# Patient Record
Sex: MALE | Race: WHITE
[De-identification: names, ages, dates, MRNs, and addresses within clinical notes are randomized per-mention and may not be internally consistent; named-entity substitution may affect disease eponyms.]

---

## 2018-08-21 ENCOUNTER — HOSPITAL ENCOUNTER (EMERGENCY)
Dept: HOSPITAL 76 - ED | Age: 53
Discharge: HOME | End: 2018-08-21
Payer: MEDICAID

## 2018-08-21 VITALS — DIASTOLIC BLOOD PRESSURE: 73 MMHG | SYSTOLIC BLOOD PRESSURE: 125 MMHG

## 2018-08-21 DIAGNOSIS — L03.114: Primary | ICD-10-CM

## 2018-08-21 DIAGNOSIS — Z23: ICD-10-CM

## 2018-08-21 DIAGNOSIS — F17.200: ICD-10-CM

## 2018-08-21 PROCEDURE — 10060 I&D ABSCESS SIMPLE/SINGLE: CPT

## 2018-08-21 PROCEDURE — 99283 EMERGENCY DEPT VISIT LOW MDM: CPT

## 2018-08-21 PROCEDURE — 90471 IMMUNIZATION ADMIN: CPT

## 2018-08-21 PROCEDURE — 90715 TDAP VACCINE 7 YRS/> IM: CPT

## 2018-08-21 NOTE — ED PHYSICIAN DOCUMENTATION
PD HPI UPPER EXT INJURY





- Stated complaint


Stated Complaint: LT WRIST INFECTION





- Chief complaint


Chief Complaint: Wound





- History obtained from


History obtained from: Patient





- History of Present Illness


Location: Left, Wrist


Type of injury: Other


Where injury occurred: Home


Timing - onset: How many days ago (2)


Timing - details: Gradual onset, Still present


Similar symptoms before: Has not had sx before


Recently seen: Not recently seen





- Additonal information


Additional information: 





Patient is a 53 year old male presenting to the emergency department for pain 

and swelling in his left wrist.  Patient states that he got debris stuck under 

his watch.  





Review of Systems


Ten Systems: 10 systems reviewed and negative


Musculoskeletal: reports: Extremity pain, Extremity swelling





PD PAST MEDICAL HISTORY





- Past Surgical History


Past Surgical History: No





- Present Medications


Home Medications: 


 Ambulatory Orders











 Medication  Instructions  Recorded  Confirmed


 


Clindamycin HCl [Clindamycin 300MG 300 mg PO Q6H 7 Days  capsule 08/21/18 





CAP]   














- Allergies


Allergies/Adverse Reactions: 


 Allergies











Allergy/AdvReac Type Severity Reaction Status Date / Time


 


No Known Drug Allergies Allergy   Verified 08/21/18 00:41














- Social History


Does the pt smoke?: Yes


Smoking Status: Current every day smoker


Does the pt drink ETOH?: No





- Immunizations


Immunizations are current?: Yes





PD ED PE NORMAL





- Vitals


Vital signs reviewed: Yes





- General


General: Alert and oriented X 3





- HEENT


HEENT: Atraumatic





- Cardiac


Cardiac: RRR, No murmur





- Respiratory


Respiratory: No respiratory distress





- Neuro


Neuro: Alert and oriented X 3


Eye Opening: Spontaneous





PD ED PE EXPANDED





- Extremities


Extremities: Left wrist (tenderness and swelling of left wrist)


JOYCE UE/Hands Visual: 


  __________________________














  __________________________





 1 - abscess








Results





- Vitals


Vitals: 


 Vital Signs - 24 hr











  08/21/18





  00:37


 


Temperature 37.7 C H


 


Heart Rate 124 H


 


Respiratory 16





Rate 


 


Blood Pressure 141/79 H


 


O2 Saturation 98








 Oxygen











O2 Source                      Room air

















Procedures





- Abscess I&D (location)


  ** left wrist


Preparation: Chlorhexadine


Incision: Incised with scalpel


Other: Antibiotic prescribed





PD MEDICAL DECISION MAKING





- ED course


Complexity details: reviewed old records, reviewed results, re-evaluated patient

, considered differential, d/w patient


ED course: 





Patient was seen and examined at bedside.  patient was treated with tdap.  

Patient's wound was incised with minimal discharge.  Patient was started on 

clindamycin.  patient was given detailed discharge and follow up instructions.  

patient required no further work up and was stable for discharge with 

outpatient follow up.  





- Sepsis Event


Vital Signs: 


 Vital Signs - 24 hr











  08/21/18





  00:37


 


Temperature 37.7 C H


 


Heart Rate 124 H


 


Respiratory 16





Rate 


 


Blood Pressure 141/79 H


 


O2 Saturation 98








 Oxygen











O2 Source                      Room air

















Departure





- Departure


Disposition: 01 Home, Self Care


Clinical Impression: 


 Cellulitis of left upper arm





Condition: Good


Instructions:  ED Infec Skin Cellulitis


Follow-Up: 


primary,care provider [Other] - Within 3 Days


Prescriptions: 


Clindamycin HCl [Clindamycin 300MG CAP] 300 mg PO Q6H 7 Days  capsule


Comments: 


Your symptoms today are likely secondary to a skin infection.  You have been 

given your first dose of antibiotics today and will need to take them for the 

next week.  You should take them with yogurt or probiotics to help reduce gi 

side effects.  you should sleep with your arm/hand elevated.  You should take 

motrin or tylenol as needed for pain.  You should follow up with your doctor 

for re-evaluation.  You may return to the emergency department at any time for 

new worsening or uncontrollable symptoms.

## 2019-02-14 ENCOUNTER — HOSPITAL ENCOUNTER (EMERGENCY)
Dept: HOSPITAL 76 - ED | Age: 54
End: 2019-02-14
Payer: MEDICAID

## 2019-02-14 ENCOUNTER — HOSPITAL ENCOUNTER (OUTPATIENT)
Dept: HOSPITAL 76 - EMS | Age: 54
Discharge: ANOTHER HOSPITAL | End: 2019-02-14
Attending: SURGERY
Payer: MEDICAID

## 2019-02-14 DIAGNOSIS — I46.9: Primary | ICD-10-CM

## 2019-02-14 DIAGNOSIS — F17.200: ICD-10-CM

## 2019-02-14 PROCEDURE — 92950 HEART/LUNG RESUSCITATION CPR: CPT

## 2019-02-14 PROCEDURE — 90471 IMMUNIZATION ADMIN: CPT

## 2019-02-14 PROCEDURE — 99285 EMERGENCY DEPT VISIT HI MDM: CPT

## 2019-02-14 NOTE — ED PHYSICIAN DOCUMENTATION
PD HPI CPR





- Stated complaint


Stated Complaint: CPR





- Chief complaint


Chief Complaint: Critical Care





- History obtained from


History obtained from: EMS





- Additional information


Additional information: 


53-year-old male was Seen shoveling snow by neighbors.  Then, the neighbors saw 

the patient on the ground unresponsive and EMS was called.  EMS arrived at 10:10

AM roughly and the patient was found to have no pulse and CPR was started.  

Initially, The patient was in PEA and BLS and ACLS measures were enacted.  The 

patient had 10 minutes of asystole prior to arrival in the emergency department.

 Prior to arrival, EMS had secured the airway with a Sancho tube, had been doing 

ongoing CPR and had given 7 rounds of epinephrine.  There was no family 

initially to give any past medical history.  The patient arrived in asystole








Review of Systems


Unable to obtain: Unresponsive, Other





PD PAST MEDICAL HISTORY





- Past Surgical History


Past Surgical History: No





- Present Medications


Home Medications: 


                                Ambulatory Orders











 Medication  Instructions  Recorded  Confirmed


 


Clindamycin HCl [Clindamycin 300MG 300 mg PO Q6H 7 Days  capsule 18 





CAP]   














- Allergies


Allergies/Adverse Reactions: 


                                    Allergies











Allergy/AdvReac Type Severity Reaction Status Date / Time


 


No Known Drug Allergies Allergy   Verified 19 11:08














- Social History


Does the pt smoke?: Yes


Smoking Status: Current every day smoker


Does the pt drink ETOH?: No


Does the pt have substance abuse?: No





- Immunizations


Immunizations are current?: Yes





- POLST


Patient has POLST: No





PD ED PE NORMAL





- General


General: Other (The patient is gravely ill appearing with CPR in progress)





- HEENT


HEENT: Atraumatic, Other (The pupils were fixed and dilated)





- Cardiac


Cardiac: Other (No heart sounds)





- Respiratory


Respiratory: Other (Bilateral coarse ventilated breath sounds)





- Abdomen


Abdomen: Soft, Non distended





- Derm


Derm: Other (The patient was mottled diffusely)





- Extremities


Extremities: No deformity





- Neuro


Neuro: Other (Unable to assess)





- Psych


Psych: Other (Unable to assess)





Results





- Vitals


Vitals: 


                               Vital Signs - 24 hr











  19





  10:56 11:11 11:26


 


Heart Rate 0 L 0 L 0 L














  19





  11:41 11:56


 


Heart Rate 0 L 0 L








                                     Oxygen











O2 Source                      Room air

















PD MEDICAL DECISION MAKING





- ED course


ED course: 


The patient was resuscitated for over 1 hour by EMS and in the emergency 

department.  The patient received 10 doses of epinephrine, 2 doses of sodium 

bicarb and was in asystole throughout his entire course in the emergency 

department.  The resuscitation was stopped at 11:14 AM and the patient was 

pronounced





The family was updated in the quiet room, social work was present.  All their 

questions were answered.  The house supervisor contacted the 














Departure





- Departure


Disposition: 20 


Clinical Impression: 


 Cardiac arrest